# Patient Record
Sex: FEMALE | Race: WHITE | URBAN - METROPOLITAN AREA
[De-identification: names, ages, dates, MRNs, and addresses within clinical notes are randomized per-mention and may not be internally consistent; named-entity substitution may affect disease eponyms.]

---

## 2017-02-06 ENCOUNTER — TRANSFERRED RECORDS (OUTPATIENT)
Dept: HEALTH INFORMATION MANAGEMENT | Facility: CLINIC | Age: 52
End: 2017-02-06

## 2017-07-19 ENCOUNTER — TRANSFERRED RECORDS (OUTPATIENT)
Dept: HEALTH INFORMATION MANAGEMENT | Facility: CLINIC | Age: 52
End: 2017-07-19

## 2017-07-20 ENCOUNTER — TELEPHONE (OUTPATIENT)
Dept: OPHTHALMOLOGY | Facility: CLINIC | Age: 52
End: 2017-07-20

## 2017-07-20 NOTE — TELEPHONE ENCOUNTER
----- Message from Haylie Candelaria sent at 7/20/2017  9:19 AM CDT -----  Regarding: NEW pt called, indicating a fax was sent from [facility] yesterday on her..  Contact: [phone]  behalf to be seen ASAP by Dr. Marcano.  Being referred by Dr. Demarco Clark from [facility] ([phone]).   Pt has lesions on the brain, both eyes in pain, one eye turning inward, the other outward.      Please call pt, OK to leave a msg, pt is NOT available for a call in the next hour.  But, pt will check her phone after that.    Thank you!  Sean BARBER    Please DO NOT send this message and/or reply back to sender.  Call Center Representatives DO NOT respond to messages.

## 2017-07-20 NOTE — TELEPHONE ENCOUNTER
Message received by triage: behalf to be seen ASAP by Dr. Marcano.  Being referred by Dr. Demarco Clark from [facility] ([phone]).   Pt has lesions on the brain, both eyes in pain, one eye turning inward, the other outward    Spoke to pt via telphone  3 MRI's done since January this year with last in June  Lesions stable per pt  Has seen Dr. Martinez and JESSICA TEIXEIRA    Past couple months constant dull ache around eyes-- worse when eyes not aligned  Eyes have been turning in or out past couple months-- one eye outward and one eye inward, and can be vice versa  Seems to be getting worse past couple months  Pt able to read fine/acuity stable  Pt been having vestibular deficit and will see specialist soon    Referred to dr. Marcano from Dr. Clark for eval   Pt states Dr. Clark used provider to provider instructions to speak with Dr. Marcano    Note to dr. Marcano/facilitator to help with scheduling  Storm Hernandez RN 4:34 PM 07/20/17

## 2017-07-24 DIAGNOSIS — H53.10 SUBJECTIVE VISUAL DISTURBANCE: Primary | ICD-10-CM

## 2017-07-25 ENCOUNTER — OFFICE VISIT (OUTPATIENT)
Dept: OPHTHALMOLOGY | Facility: CLINIC | Age: 52
End: 2017-07-25
Attending: OPHTHALMOLOGY
Payer: COMMERCIAL

## 2017-07-25 DIAGNOSIS — H53.10 SUBJECTIVE VISUAL DISTURBANCE: Primary | ICD-10-CM

## 2017-07-25 DIAGNOSIS — H35.371 EPIRETINAL MEMBRANE (ERM) OF RIGHT EYE: ICD-10-CM

## 2017-07-25 DIAGNOSIS — H50.05 ALTERNATING ESOTROPIA: ICD-10-CM

## 2017-07-25 PROCEDURE — 99215 OFFICE O/P EST HI 40 MIN: CPT

## 2017-07-25 PROCEDURE — 83519 RIA NONANTIBODY: CPT | Performed by: OPHTHALMOLOGY

## 2017-07-25 PROCEDURE — 36415 COLL VENOUS BLD VENIPUNCTURE: CPT | Performed by: OPHTHALMOLOGY

## 2017-07-25 PROCEDURE — 92083 EXTENDED VISUAL FIELD XM: CPT | Mod: ZF | Performed by: OPHTHALMOLOGY

## 2017-07-25 PROCEDURE — 99215 OFFICE O/P EST HI 40 MIN: CPT | Mod: 25,ZF

## 2017-07-25 PROCEDURE — 92060 SENSORIMOTOR EXAMINATION: CPT | Mod: ZF | Performed by: OPHTHALMOLOGY

## 2017-07-25 RX ORDER — ATROPINE SULFATE 10 MG/ML
1 SOLUTION/ DROPS OPHTHALMIC DAILY
Qty: 1 BOTTLE | Refills: 0 | Status: SHIPPED | OUTPATIENT
Start: 2017-07-25

## 2017-07-25 ASSESSMENT — TONOMETRY
OS_IOP_MMHG: 21
IOP_METHOD: TONOPEN
OD_IOP_MMHG: 21

## 2017-07-25 ASSESSMENT — CONF VISUAL FIELD
OD_NORMAL: 1
OS_NORMAL: 1

## 2017-07-25 ASSESSMENT — CUP TO DISC RATIO
OS_RATIO: 0.45
OD_RATIO: 0.35

## 2017-07-25 ASSESSMENT — REFRACTION_WEARINGRX
OS_SPHERE: PLANO
OD_ADD: +2.75
OS_AXIS: 035
OS_ADD: +2.75
OD_SPHERE: -0.50
OS_CYLINDER: +0.50
OD_CYLINDER: SPHERE

## 2017-07-25 ASSESSMENT — VISUAL ACUITY
OS_SC: 20/20
METHOD: SNELLEN - LINEAR
OD_SC: 20/25

## 2017-07-25 ASSESSMENT — EXTERNAL EXAM - RIGHT EYE: OD_EXAM: NORMAL

## 2017-07-25 ASSESSMENT — SLIT LAMP EXAM - LIDS
COMMENTS: NORMAL
COMMENTS: NORMAL

## 2017-07-25 ASSESSMENT — EXTERNAL EXAM - LEFT EYE: OS_EXAM: NORMAL

## 2017-07-25 NOTE — PROGRESS NOTES
Assessment & Plan     Jerrica Ken is a 52 year old female with the following diagnoses:   1. Subjective visual disturbance    2. Epiretinal membrane (ERM) of right eye    3. Alternating esotropia       Mrs Ken is here for initial evaluation of photosensitivity and possible ophthalmic manifestations associated with MS. There is no evidence of sixth nerve palsy or internuclear ophthalmoplegia.  Saccades and pursuits were normal on exam. Her color vision is normal and there is no afferent pupillary defect.  She showed me pictures of her having esotropia.  She does not have a latent esotropia today.  It is unusual to have an intermittent esotropia like she describes.  There is a condition called cyclic esotropia but that is almost always in children and has a much more REGULAR tempo to it.  One concern is that this is convergence spasm. We discussed that she could try atropine to try and break this.  She is willing to try this and will give this to her for 7 days.  Will also check acetylcholine receptor binding antibody and ask patient to come in when esotropia is present.      She has eye pain RIGHT eye.  This is not consistent with optic neuritis. She does not have an afferent pupillary defect.  She does not have optic atrophy.  The pain has lasted too long for optic neuritis. I reviewed her MRI and there is no optic nerve enhancement.  I would treat this as a headache.      She has significant sheathing of her retinal arteries and veins in the RIGHT eye.  Dr. Trinidad Olivia did a fluorescein angiogram last visit and is seeing her on Aug 8 in [place].  I will ask Dr. Olivia whether this is consistent with retinal vasculitis.  If it is, then she may have some form of cerebral vasculitis too and may warrant further investigation by Dr. Clark.             Attending Physician Attestation:  Complete documentation of historical and exam elements from today's encounter can be found in the full encounter summary  report (not reduplicated in this progress note).  I personally obtained the chief complaint(s) and history of present illness.  I confirmed and edited as necessary the review of systems, past medical/surgical history, family history, social history, and examination findings as documented by others; and I examined the patient myself.  I personally reviewed the relevant tests, images, and reports as documented above.  I formulated and edited as necessary the assessment and plan and discussed the findings and management plan with the patient and family. - Trevor Marcano MD

## 2017-07-25 NOTE — MR AVS SNAPSHOT
After Visit Summary   2017    Jerrica Ken    MRN: [id number]           Patient Information     Date Of Birth          1965        Visit Information        Provider Department      2017 12:15 PM Trevor Marcano MD [facility]        Today's Diagnoses     Subjective visual disturbance    -  1    Epiretinal membrane (ERM) of right eye        Alternating esotropia           Follow-ups after your visit        Who to contact     Please call your clinic at [phone] to:    Ask questions about your health    Make or cancel appointments    Discuss your medicines    Learn about your test results    Speak to your doctor   If you have compliments or concerns about an experience at your clinic, or if you wish to file a complaint, please contact [facility] Patient Relations at [phone] or email us at [email or web address]         Additional Information About Your Visit        MyChart Information     PDVt is an electronic gateway that provides easy, online access to your medical records. With [employer], you can request a clinic appointment, read your test results, renew a prescription or communicate with your care team.     To sign up for PDVt visit the website at [email or web address]   You will be asked to enter the access code listed below, as well as some personal information. Please follow the directions to create your username and password.     Your access code is: [email or web address]  Expires: 10/22/2017  6:30 AM     Your access code will  in 90 days. If you need help or a new code, please contact your [facility] or call [phone] for assistance.        Care EveryWhere ID     This is your Care EveryWhere ID. This could be used by other organizations to access your [place] medical records  [id number]         Blood Pressure from Last 3 Encounters:   16 126/74   10/17/14 120/70    Weight from  Last 3 Encounters:   07/11/16 62.6 kg (138 lb)   10/17/14 66.2 kg (146 lb)              We Performed the Following     Acetylcholine receptor binding     Glaucoma Top OU     IOP Measurement     Sensorimotor          Today's Medication Changes          These changes are accurate as of: 7/25/17  3:35 PM.  If you have any questions, ask your nurse or doctor.               Start taking these medicines.        Dose/Directions    atropine 1 % ophthalmic solution   Used for:  Subjective visual disturbance, Epiretinal membrane (ERM) of right eye, Alternating esotropia   Started by:  Trevor Marcano MD        Dose:  1 drop   Place 1 drop into both eyes daily   Quantity:  1 Bottle   Refills:  0            Where to get your medicines      These medications were sent to [facility] [address] [place] [address] AT [facility] & [address] [address]     Phone:  [phone]     atropine 1 % ophthalmic solution                Primary Care Provider Office Phone # Fax #    Faisal Tay [phone]       [address] [address]        Equal Access to Services     JOSE [facility] HospitalLAVELL AH: Hadii maki ku hadasho Soomaali, waaxda luqadaha, qaybta kaalmada adeegyada, leah quach . So [facility] [phone].    ATENCIÓN: Si habla español, tiene a casey disposición servicios gratuitos de asistencia lingüística. [facility] [phone].    We comply with applicable federal civil rights laws and [place] laws. We do not discriminate on the basis of race, color, national origin, age, disability sex, sexual orientation or gender identity.            Thank you!     Thank you for choosing [facility]  for your care. Our goal is always to provide you with excellent care. Hearing back from our patients is one way we can continue to improve our services. Please take a few minutes to complete the written survey  that you may receive in the mail after your visit with us. Thank you!             Your Updated Medication List - Protect others around you: Learn how to safely use, store and throw away your medicines at www.disposemymeds.org.          This list is accurate as of: 7/25/17  3:35 PM.  Always use your most recent med list.                   Brand Name Dispense Instructions for use Diagnosis    ADDERALL PO      Take 20 mg by mouth 2 times daily        * albuterol 108 (90 BASE) MCG/ACT Inhaler    PROAIR HFA/PROVENTIL HFA/VENTOLIN HFA     Inhale 2 puffs into the lungs every 6 hours        * albuterol (2.5 MG/3ML) 0.083% neb solution      Take 1 vial by nebulization every 6 hours as needed for shortness of breath / dyspnea or wheezing        atropine 1 % ophthalmic solution     1 Bottle    Place 1 drop into both eyes daily    Subjective visual disturbance, Epiretinal membrane (ERM) of right eye, Alternating esotropia       TYLENOL PO           WELLBUTRIN PO      Take 150 mg by mouth 3 times daily        * Notice:  This list has 2 medication(s) that are the same as other medications prescribed for you. Read the directions carefully, and ask your doctor or other care provider to review them with you.

## 2017-07-25 NOTE — NURSING NOTE
Chief Complaints and History of Present Illnesses   Patient presents with     Neurologic Problem     new pt     HPI    Symptoms:              Comments:  Jerrica is a 52 year old female presenting with a history of:    1. Headaches  - had a headache at the beginning of the year. Worst she has ever had. Had MRI at [facility], which showed new lesions.   - February 2017: eye pain: like she was hit in the eye. Started with right eye, now includes left eye.   - no diplopia  - ++ photophobic   - tries not to drive at night.   - bothered by headlights. Wears sunglasses at night.   - as time progressed, has had more symptoms of intermittent wandering/crossing of eyes. But no diplopia.   - feels pressure behind right eye.   - wakes up with headache everyday.   - 1 episode in June that led her to go to ED. Dizziness in the morning, headache and pressure feeling. That was the only time she had an esotropia with diplopia.   - feels she has a cental field deficit in the right eye.   - history of macular hole right eye per patient account.   - black/gray cloud that will not go away.   - no tinnitus  - [facility]. Was told she has a significant vestibular deficit, also associated with hearing loss bilaterally.     Merlene MONTIEL 12:26 PM July 25, 2017

## 2017-07-25 NOTE — LETTER
2017         RE:  :  MRN: Jerrica Ken  1965  [id number]     Dear Dr. Clark,    Thank you for asking me to see your very pleasant patient, Jerrica Ken, in neuro-ophthalmic consultation.  I would like to thank you for sending your records and I have summarized them in the history of present illness. My assessment and plan are below.  For further details, please see my attached clinic note.          Assessment & Plan     Jerrica Ken is a 52 year old female with the following diagnoses:   1. Subjective visual disturbance    2. Epiretinal membrane (ERM) of right eye    3. Alternating esotropia       Mrs Ken is here for initial evaluation of photosensitivity and possible ophthalmic manifestations associated with MS. There is no evidence of sixth nerve palsy or internuclear ophthalmoplegia.  Saccades and pursuits were normal on exam. Her color vision is normal and there is no afferent pupillary defect.  She showed me pictures of her having esotropia.  She does not have a latent esotropia today.  It is unusual to have an intermittent esotropia like she describes.  There is a condition called cyclic esotropia but that is almost always in children and has a much more REGULAR tempo to it.  One concern is that this is convergence spasm. We discussed that she could try atropine to try and break this.  She is willing to try this and will give this to her for 7 days.  Will also check acetylcholine receptor binding antibody and ask patient to come in when esotropia is present.      She has eye pain RIGHT eye.  This is not consistent with optic neuritis. She does not have an afferent pupillary defect.  She does not have optic atrophy.  The pain has lasted too long for optic neuritis. I reviewed her MRI and there is no optic nerve enhancement.  I would treat this as a headache.      She has significant sheathing of her retinal arteries and veins in the RIGHT eye.  Dr. Trinidad Olivia did a fluorescein  angiogram last visit and is seeing her on Aug 8 in [place].  I will ask Dr. Olivia whether this is consistent with retinal vasculitis.  If it is, then she may have some form of cerebral vasculitis too and may warrant further investigation by Dr. Clark.      Again, thank you for allowing me to participate in the care of your patient.      Sincerely,    Trevor Marcano MD  Professor, Neuro-Ophthalmology  Department of Ophthalmology and Visual Neurosciences  [facility]    CC: Demarco Clark MD  [facility]  [address] [facility] [address] [address]  VIA Facsimile: [phone]     Faisal Tay MD  [facility]  [address] [facility] [address] [address] [address]  VIA In Basket

## 2017-07-27 LAB — ACHR BIND AB SER-SCNC: 0 NMOL/L

## 2021-05-28 ENCOUNTER — RECORDS - HEALTHEAST (OUTPATIENT)
Dept: ADMINISTRATIVE | Facility: CLINIC | Age: 56
End: 2021-05-28

## 2021-05-30 ENCOUNTER — RECORDS - HEALTHEAST (OUTPATIENT)
Dept: ADMINISTRATIVE | Facility: CLINIC | Age: 56
End: 2021-05-30

## 2021-05-31 ENCOUNTER — RECORDS - HEALTHEAST (OUTPATIENT)
Dept: ADMINISTRATIVE | Facility: CLINIC | Age: 56
End: 2021-05-31

## 2021-07-21 ENCOUNTER — RECORDS - HEALTHEAST (OUTPATIENT)
Dept: ADMINISTRATIVE | Facility: CLINIC | Age: 56
End: 2021-07-21